# Patient Record
Sex: FEMALE | URBAN - METROPOLITAN AREA
[De-identification: names, ages, dates, MRNs, and addresses within clinical notes are randomized per-mention and may not be internally consistent; named-entity substitution may affect disease eponyms.]

---

## 2019-05-13 ENCOUNTER — APPOINTMENT (RX ONLY)
Dept: URBAN - METROPOLITAN AREA CLINIC 173 | Facility: CLINIC | Age: 59
Setting detail: DERMATOLOGY
End: 2019-05-13

## 2019-05-13 DIAGNOSIS — H01.13 ECZEMATOUS DERMATITIS OF EYELID: ICD-10-CM

## 2019-05-13 DIAGNOSIS — L21.8 OTHER SEBORRHEIC DERMATITIS: ICD-10-CM

## 2019-05-13 PROBLEM — I10 ESSENTIAL (PRIMARY) HYPERTENSION: Status: ACTIVE | Noted: 2019-05-13

## 2019-05-13 PROBLEM — L85.3 XEROSIS CUTIS: Status: ACTIVE | Noted: 2019-05-13

## 2019-05-13 PROBLEM — L29.8 OTHER PRURITUS: Status: ACTIVE | Noted: 2019-05-13

## 2019-05-13 PROBLEM — L30.9 DERMATITIS, UNSPECIFIED: Status: ACTIVE | Noted: 2019-05-13

## 2019-05-13 PROCEDURE — ? COUNSELING

## 2019-05-13 PROCEDURE — ? DEFER

## 2019-05-13 PROCEDURE — ? TREATMENT REGIMEN

## 2019-05-13 PROCEDURE — ? PRESCRIPTION

## 2019-05-13 PROCEDURE — 99202 OFFICE O/P NEW SF 15 MIN: CPT

## 2019-05-13 RX ORDER — HYDROCORTISONE 2.5 %
1 OINTMENT (GRAM) TOPICAL TWICE A DAY
Qty: 1 | Refills: 0 | Status: ERX | COMMUNITY
Start: 2019-05-13

## 2019-05-13 RX ADMIN — Medication 1: at 00:00

## 2019-05-13 ASSESSMENT — LOCATION SIMPLE DESCRIPTION DERM
LOCATION SIMPLE: LEFT EYELID
LOCATION SIMPLE: RIGHT CHEEK
LOCATION SIMPLE: LEFT SUPERIOR EYELID
LOCATION SIMPLE: RIGHT SUPERIOR EYELID
LOCATION SIMPLE: LEFT CHEEK
LOCATION SIMPLE: RIGHT EYELID

## 2019-05-13 ASSESSMENT — LOCATION ZONE DERM
LOCATION ZONE: FACE
LOCATION ZONE: EYELID

## 2019-05-13 ASSESSMENT — LOCATION DETAILED DESCRIPTION DERM
LOCATION DETAILED: LEFT MEDIAL SUPERIOR EYELID
LOCATION DETAILED: RIGHT SUPERIOR MEDIAL MALAR CHEEK
LOCATION DETAILED: LEFT MEDIAL CANTHUS
LOCATION DETAILED: RIGHT MEDIAL SUPERIOR EYELID
LOCATION DETAILED: LEFT LATERAL SUPERIOR EYELID
LOCATION DETAILED: LEFT SUPERIOR MEDIAL MALAR CHEEK
LOCATION DETAILED: RIGHT MEDIAL CANTHUS

## 2019-05-13 NOTE — PROCEDURE: TREATMENT REGIMEN
Initiate Treatment: HC 2.5% ointment to sideburn and back of scalp bid
Detail Level: Zone
Plan: Hx per patient:\\nprior tx from PCP: Ketoconazole shampoo x 3 months and a pill twice a day x 1 month; uncertain of pill
Initiate Treatment: Hydrocortisone ointment as directed

## 2019-05-13 NOTE — PROCEDURE: DEFER
Detail Level: Simple
Procedure To Be Performed At Next Visit: Patch testing
Introduction Text (Please End With A Colon): The following procedure was deferred:

## 2019-06-17 ENCOUNTER — APPOINTMENT (RX ONLY)
Dept: URBAN - METROPOLITAN AREA CLINIC 173 | Facility: CLINIC | Age: 59
Setting detail: DERMATOLOGY
End: 2019-06-17

## 2019-06-17 DIAGNOSIS — L259 CONTACT DERMATITIS AND OTHER ECZEMA, UNSPECIFIED CAUSE: ICD-10-CM

## 2019-06-17 PROBLEM — L23.9 ALLERGIC CONTACT DERMATITIS, UNSPECIFIED CAUSE: Status: ACTIVE | Noted: 2019-06-17

## 2019-06-17 PROCEDURE — ? OTHER

## 2019-06-17 PROCEDURE — 95044 PATCH/APPLICATION TESTS: CPT

## 2019-06-17 PROCEDURE — ? PATCH TESTING

## 2019-06-17 NOTE — PROCEDURE: OTHER
Detail Level: Detailed
Note Text (......Xxx Chief Complaint.): This diagnosis correlates with the
Other (Free Text): 5 allergens new to the NA Series in 2018 were tested:\\n36. Hydroperoxides of limonene\\n40. Hydroperoxides of linalool\\n47. Textile dye mix\\n48. Oleamidopropyl dimethylamine\\n53. Decyl glucoside.\\n\\nThese replaced old allergens in [id number] series, which were not tested:\\n36. Ethylene urea melanine formaldehyde resin\\n40. Glyceryl monothioglycolate\\n47. Hydrocortisone 17 butyrate\\n48. Dimethylol dihydroxyethylene urea\\n53. Lauryl glucoside.

## 2019-06-19 ENCOUNTER — APPOINTMENT (RX ONLY)
Dept: URBAN - METROPOLITAN AREA CLINIC 173 | Facility: CLINIC | Age: 59
Setting detail: DERMATOLOGY
End: 2019-06-19

## 2019-06-19 DIAGNOSIS — L259 CONTACT DERMATITIS AND OTHER ECZEMA, UNSPECIFIED CAUSE: ICD-10-CM

## 2019-06-19 PROBLEM — L23.9 ALLERGIC CONTACT DERMATITIS, UNSPECIFIED CAUSE: Status: ACTIVE | Noted: 2019-06-19

## 2019-06-19 PROCEDURE — ? NORTH AMERICAN 65 READING

## 2019-06-19 PROCEDURE — 99212 OFFICE O/P EST SF 10 MIN: CPT

## 2019-06-19 NOTE — PROCEDURE: [employer] 67 READING
16. Mercapto Mix: no reaction
What Reading Time Point?: 48 hour
Number Of Patches Read: 65
Show Negative Results In The Note?: Yes
Show Allergen Counseling In The Note?: No
Detail Level: Zone

## 2019-06-20 ENCOUNTER — APPOINTMENT (RX ONLY)
Dept: URBAN - METROPOLITAN AREA CLINIC 173 | Facility: CLINIC | Age: 59
Setting detail: DERMATOLOGY
End: 2019-06-20

## 2019-06-20 DIAGNOSIS — L259 CONTACT DERMATITIS AND OTHER ECZEMA, UNSPECIFIED CAUSE: ICD-10-CM

## 2019-06-20 DIAGNOSIS — L21.8 OTHER SEBORRHEIC DERMATITIS: ICD-10-CM

## 2019-06-20 DIAGNOSIS — H01.13 ECZEMATOUS DERMATITIS OF EYELID: ICD-10-CM

## 2019-06-20 PROBLEM — L23.9 ALLERGIC CONTACT DERMATITIS, UNSPECIFIED CAUSE: Status: ACTIVE | Noted: 2019-06-20

## 2019-06-20 PROBLEM — L30.9 DERMATITIS, UNSPECIFIED: Status: ACTIVE | Noted: 2019-06-20

## 2019-06-20 PROCEDURE — ? TREATMENT REGIMEN

## 2019-06-20 PROCEDURE — 99213 OFFICE O/P EST LOW 20 MIN: CPT

## 2019-06-20 PROCEDURE — ? NORTH AMERICAN 65 READING

## 2019-06-20 PROCEDURE — ? COUNSELING

## 2019-06-20 PROCEDURE — ? PRESCRIPTION

## 2019-06-20 RX ORDER — BETAMETHASONE DIPROPIONATE 0.5 MG/ML
LOTION TOPICAL QD
Qty: 1 | Refills: 2 | Status: ERX | COMMUNITY
Start: 2019-06-20

## 2019-06-20 RX ORDER — HYDROCORTISONE 2.5 %
1 OINTMENT (GRAM) TOPICAL TWICE A DAY
Qty: 1 | Refills: 0 | Status: ERX | COMMUNITY
Start: 2019-06-20

## 2019-06-20 RX ADMIN — Medication 1: at 00:00

## 2019-06-20 RX ADMIN — BETAMETHASONE DIPROPIONATE 1: 0.5 LOTION TOPICAL at 00:00

## 2019-06-20 ASSESSMENT — LOCATION DETAILED DESCRIPTION DERM
LOCATION DETAILED: POSTERIOR MID-PARIETAL SCALP
LOCATION DETAILED: LEFT MEDIAL SUPERIOR EYELID
LOCATION DETAILED: RIGHT MEDIAL SUPERIOR EYELID

## 2019-06-20 ASSESSMENT — LOCATION SIMPLE DESCRIPTION DERM
LOCATION SIMPLE: LEFT SUPERIOR EYELID
LOCATION SIMPLE: POSTERIOR SCALP
LOCATION SIMPLE: RIGHT SUPERIOR EYELID

## 2019-06-20 ASSESSMENT — LOCATION ZONE DERM
LOCATION ZONE: EYELID
LOCATION ZONE: SCALP

## 2019-06-20 NOTE — PROCEDURE: TREATMENT REGIMEN
Plan: from initial visit:\\nHx per patient:\\nprior tx from PCP: Ketoconazole shampoo x 3 months and a pill twice a day x 1 month; uncertain of pill.\\n\\nPLAN:\\nBegin Head and Shoulders shampoo (free of IPB carbamate), leaving on scalp skin for >10 minutes before adding water, lathering and rinsing, daily.\\nApply BMDP lotion to itchy areas of scalp daily post shampooing.\\n
Detail Level: Zone
Plan: -Begin using a \"sheen\" application of Vaseline jelly to eyelid skin daily to reduce flares of dermatitis. Keep out of eyes.\\n -AVOID ANY PRODUCTS with your identified allergen, \"IPB carbamate\".\\n-Continue prn flares: HC 2.5% ointment 2x/d sparing up to 2 weeks per month. Keep out of eyes.\\n-See your ophthalmologist for eye pressure check in July, as topical steroid used continuously may increase eye pressure. She understands and is followed on a regular basis by her ophthalmologist because of her diabetes
Plan: Today, gave printed information on IPB carbamate and sources as well as shampoo and conditioners free of this allergen. Also, sent to email, CARD PDF of personal products free of IPB carbamate
Detail Level: Detailed

## 2019-06-20 NOTE — PROCEDURE: [employer] 67 READING
47. Textile Dye Mix: no reaction
37. Iodopropynyl Butyl Carbamate: 1+
What Reading Time Point?: 72 hour
Show Negative Results In The Note?: Yes
Number Of Patches Read: 65
Show Allergen Counseling In The Note?: No
Detail Level: Zone

## 2023-05-01 ENCOUNTER — OFFICE VISIT (OUTPATIENT)
Dept: URBAN - METROPOLITAN AREA CLINIC 17 | Facility: CLINIC | Age: 63
End: 2023-05-01
Payer: MEDICAID

## 2023-05-01 DIAGNOSIS — H35.371 PUCKERING OF MACULA, RIGHT EYE: ICD-10-CM

## 2023-05-01 DIAGNOSIS — H26.493 OTHER SECONDARY CATARACT, BILATERAL: ICD-10-CM

## 2023-05-01 DIAGNOSIS — E11.9 TYPE 2 DIABETES MELLITUS W/O COMPLICATION: Primary | ICD-10-CM

## 2023-05-01 DIAGNOSIS — H04.123 DRY EYE SYNDROME OF BILATERAL LACRIMAL GLANDS: ICD-10-CM

## 2023-05-01 DIAGNOSIS — H43.813 VITREOUS DEGENERATION, BILATERAL: ICD-10-CM

## 2023-05-01 PROCEDURE — 92004 COMPRE OPH EXAM NEW PT 1/>: CPT | Performed by: OPTOMETRIST

## 2023-05-01 ASSESSMENT — INTRAOCULAR PRESSURE
OD: 15
OS: 14

## 2023-05-01 NOTE — IMPRESSION/PLAN
Impression: Vitreous degeneration, bilateral: H43.813. Plan: There is no evidence of retinal pathology. No treatment is required at this time. Will continue to observe.

## 2023-05-01 NOTE — IMPRESSION/PLAN
Impression: Type 2 diabetes mellitus w/o complication: E17.8. Bilateral. Plan: Diabetes type II: no background retinopathy, no signs of neovascularization noted. Discussed ocular and systemic benefits of blood sugar control.